# Patient Record
(demographics unavailable — no encounter records)

---

## 2017-03-11 NOTE — ER
DATE SEEN:  03/10/2017

 

TIME SEEN:  Patient was seen just after arrival, was seen at 2358 hours on

03/10/2017.

 

HISTORY OF PRESENT ILLNESS:  The patient is a term pregnancy.  .

Immunizations up-to-date as of yesterday.  She received at 1100 hours on

03/10/2017 hepatitis B, hepatitis A, and DPT and oral polio.

 

The patient has a fever on this evening, 99 temperature.  Father is concerned.

 

The patient has no shortness of breath, cough, vomiting, or diarrhea.  No

history of seizure, fever, runny nose, or stiff neck.

 

PHYSICAL EXAMINATION:  VITAL SIGNS:  See nurse's notes regarding vital signs.

Heart rate 154, respirations 21, oxygen saturation 98%, temperature is 38.6

degrees centigrade.  HEENT:  PERRLA intact.  Pharynx without abnormality.

Slight rash to the body, erythema, macular in character.  Not patchy, no islands

of white dermis, no lacy-like rash.  TMs negative.  Pharynx without erythema.

Minimal cervical adenopathy.  LUNGS:  Clear to auscultation without rales,

rhonchi, or wheezes.  HEART:  S1, S2.  No murmur.  ABDOMEN:  Soft.  No

hepatosplenomegaly, guarding, or discomfort.  EXTREMITIES:  Without abnormality.

Alert and has lusty cry.  Good muscle tone.

 

ASSESSMENT:

1. Viremia.

2. Possible viremia plus immunization process.

3. Possible immunization-induced fever elevation.

 

DIAGNOSIS:  Fever, etiology indeterminate, possibly one of the 3 possibilities

above.

 

The patient dismissed.  Use ibuprofen 80 mg and 120 mg Tylenol every 6 hours

together p.r.n. irritability or fever.  If markedly worse, see doctor in 24

hours.  Otherwise, see the doctor as needed.

 

Job#: [id number]

DD: [id number]

DT: 2017 [id number] KAMLESH/JAQUELINE

## 2017-09-06 NOTE — EDM.PDOC
ED HPI GENERAL MEDICAL PROBLEM





- General


Chief Complaint: Fever


Stated Complaint: COUGHING, FEVER, SOB


Time Seen by Provider: 09/06/17 10:20


Source of Information: Reports: Family


History Limitations: Reports: No Limitations





- History of Present Illness


INITIAL COMMENTS - FREE TEXT/NARRATIVE: 





Gagan awoke this am with some respiratory congestion and occasional cough, and 

refused breakfast. Her temp was 99.3 deg F. There is no ear tugging, wheezing, 

or rash. No meds have been offered. 





- Related Data


 Allergies











Allergy/AdvReac Type Severity Reaction Status Date / Time


 


No Known Allergies Allergy   Verified 09/06/17 10:32











Home Meds: 


 Home Meds





NK [No Known Home Meds]  09/14/16 [History]











Past Medical History





- Past Health History


Medical/Surgical History: Denies Medical/Surgical History


Hematologic History: Reports: None


Immunologic History: Reports: None





Social & Family History





- Family History


Family Medical History: Noncontributory





- Tobacco Use


Smoking Status *Q: Never Smoker





- Caffeine Use


Caffeine Use: Reports: None





- Recreational Drug Use


Recreational Drug Use: No





ED ROS PEDIATRIC





- Review of Systems


Review Of Systems: ROS reveals no pertinent complaints other than HPI.





ED EXAM, GENERAL (PEDS)





- Physical Exam


Exam: See Below


Exam Limited By: No Limitations


General Appearance: WD/WN, No Apparent Distress, Crying on Exam


Eyes: Bilateral: Normal Appearance, EOMI


Ear (Abbreviated): Normal Canal, Normal TMs


Nose Exam: Nasal Discharge (clear watery)


Mouth/Throat: Normal Inspection, Normal Gums, Normal Lips, Normal Oropharynx, 

Normal Teeth


Head: Normocephalic


Neck: Normal Inspection, Supple


Respiratory/Chest: No Respiratory Distress, Lungs Clear, Normal Breath Sounds, 

No Accessory Muscle Use


Cardiovascular: Regular Rate, Rhythm, No Murmur


GI/Abdominal Exam: Normal Bowel Sounds, Soft, Non-Tender, No Organomegaly, No 

Distention, No Mass


Back Exam: Normal Inspection


Extremities: Normal Inspection


Neurological: Alert, CN II-XII Intact, No Motor/Sensory Deficits


Psychiatric: Tearful


Skin Exam: Warm, Dry, Intact


Lymphadenopathy: Bilateral: No Adenopathy





Course





- Vital Signs


Text/Narrative:: 





Gagan remained stable at the [facility] ED. No meds were administered. 


Last Recorded V/S: 





 Last Vital Signs











Temp  37.6 C   09/06/17 10:35


 


Pulse  120 H  09/06/17 10:35


 


Resp      


 


BP      


 


Pulse Ox      














Departure





- Departure


Time of Disposition: 10:41


Disposition: Home, Self-Care 01


Condition: Good


Clinical Impression: 


 Viral upper respiratory illness








- Discharge Information


Referrals: 


Rola Mendoza NP [Primary Care Provider] - 





- Problem List & Annotations


(1) Viral upper respiratory illness


SNOMED Code(s): 107650517


   Code(s): J06.9 - ACUTE UPPER RESPIRATORY INFECTION, UNSPECIFIED; B97.89 - 

OTH VIRAL AGENTS AS THE CAUSE OF DISEASES CLASSD ELSWHR   Status: Acute   

Annotation/Comment:: Apparent viral URI, managed sxs with hydration and monitor 

and fever.    





- Problem List Review


Problem List Initiated/Reviewed/Updated: Yes





- Assessment/Plan


Plan: 





Follow up with PCP if needed.
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
